# Patient Record
Sex: MALE | Race: WHITE | URBAN - METROPOLITAN AREA
[De-identification: names, ages, dates, MRNs, and addresses within clinical notes are randomized per-mention and may not be internally consistent; named-entity substitution may affect disease eponyms.]

---

## 2018-07-24 ENCOUNTER — INPATIENT (INPATIENT)
Facility: HOSPITAL | Age: 53
LOS: 1 days | Discharge: HOME | End: 2018-07-26
Attending: HOSPITALIST | Admitting: HOSPITALIST

## 2018-07-24 VITALS
DIASTOLIC BLOOD PRESSURE: 94 MMHG | SYSTOLIC BLOOD PRESSURE: 137 MMHG | TEMPERATURE: 99 F | OXYGEN SATURATION: 100 % | HEART RATE: 70 BPM | RESPIRATION RATE: 20 BRPM
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
## 2018-07-24 RX ORDER — OXYCODONE AND ACETAMINOPHEN 5; 325 MG/1; MG/1
1 TABLET ORAL EVERY 4 HOURS
Qty: 0 | Refills: 0 | Status: DISCONTINUED | OUTPATIENT
Start: 2018-07-24 | End: 2018-07-25

## 2018-07-24 RX ORDER — METHOCARBAMOL 500 MG/1
1000 TABLET, FILM COATED ORAL ONCE
Qty: 0 | Refills: 0 | Status: COMPLETED | OUTPATIENT
Start: 2018-07-24 | End: 2018-07-24

## 2018-07-24 RX ORDER — KETOROLAC TROMETHAMINE 30 MG/ML
30 SYRINGE (ML) INJECTION ONCE
Qty: 0 | Refills: 0 | Status: DISCONTINUED | OUTPATIENT
Start: 2018-07-24 | End: 2018-07-24

## 2018-07-24 RX ADMIN — Medication 60 MILLIGRAM(S): at 22:32

## 2018-07-24 RX ADMIN — Medication 30 MILLIGRAM(S): at 23:15

## 2018-07-24 RX ADMIN — METHOCARBAMOL 1000 MILLIGRAM(S): 500 TABLET, FILM COATED ORAL at 22:32

## 2018-07-24 RX ADMIN — Medication 30 MILLIGRAM(S): at 22:32

## 2018-07-24 NOTE — ED ADULT TRIAGE NOTE - CHIEF COMPLAINT QUOTE
Patient BIBA for back pain. Patient was picking up jugs of water when he felt sudden back pain and was unable to move. Morphine 10mg IV 1820 given by EMS. Right AC #18 placed by EMS.

## 2018-07-24 NOTE — ED ADULT NURSE NOTE - OBJECTIVE STATEMENT
Pt  came with EMS A.O times 4 with C.O lower back pain more to the right side after he lifted heavy box 55 pound , pt S.P pain medication is given by EMS ,pt report  slight relief of pain worse on activity .

## 2018-07-25 LAB
ALBUMIN SERPL ELPH-MCNC: 4.6 G/DL — SIGNIFICANT CHANGE UP (ref 3.5–5.2)
ALP SERPL-CCNC: 50 U/L — SIGNIFICANT CHANGE UP (ref 30–115)
ALT FLD-CCNC: 31 U/L — SIGNIFICANT CHANGE UP (ref 0–41)
ANION GAP SERPL CALC-SCNC: 16 MMOL/L — HIGH (ref 7–14)
AST SERPL-CCNC: 26 U/L — SIGNIFICANT CHANGE UP (ref 0–41)
BASOPHILS # BLD AUTO: 0.02 K/UL — SIGNIFICANT CHANGE UP (ref 0–0.2)
BASOPHILS NFR BLD AUTO: 0.2 % — SIGNIFICANT CHANGE UP (ref 0–1)
BILIRUB SERPL-MCNC: 0.4 MG/DL — SIGNIFICANT CHANGE UP (ref 0.2–1.2)
BUN SERPL-MCNC: 13 MG/DL — SIGNIFICANT CHANGE UP (ref 10–20)
CALCIUM SERPL-MCNC: 9.4 MG/DL — SIGNIFICANT CHANGE UP (ref 8.5–10.1)
CHLORIDE SERPL-SCNC: 101 MMOL/L — SIGNIFICANT CHANGE UP (ref 98–110)
CO2 SERPL-SCNC: 21 MMOL/L — SIGNIFICANT CHANGE UP (ref 17–32)
CREAT SERPL-MCNC: 1.1 MG/DL — SIGNIFICANT CHANGE UP (ref 0.7–1.5)
EOSINOPHIL # BLD AUTO: 0.02 K/UL — SIGNIFICANT CHANGE UP (ref 0–0.7)
EOSINOPHIL NFR BLD AUTO: 0.2 % — SIGNIFICANT CHANGE UP (ref 0–8)
GLUCOSE SERPL-MCNC: 125 MG/DL — HIGH (ref 70–99)
HCT VFR BLD CALC: 43.6 % — SIGNIFICANT CHANGE UP (ref 42–52)
HGB BLD-MCNC: 15 G/DL — SIGNIFICANT CHANGE UP (ref 14–18)
IMM GRANULOCYTES NFR BLD AUTO: 0.5 % — HIGH (ref 0.1–0.3)
LYMPHOCYTES # BLD AUTO: 1.14 K/UL — LOW (ref 1.2–3.4)
LYMPHOCYTES # BLD AUTO: 11.3 % — LOW (ref 20.5–51.1)
MCHC RBC-ENTMCNC: 29.6 PG — SIGNIFICANT CHANGE UP (ref 27–31)
MCHC RBC-ENTMCNC: 34.4 G/DL — SIGNIFICANT CHANGE UP (ref 32–37)
MCV RBC AUTO: 86 FL — SIGNIFICANT CHANGE UP (ref 80–94)
MONOCYTES # BLD AUTO: 0.3 K/UL — SIGNIFICANT CHANGE UP (ref 0.1–0.6)
MONOCYTES NFR BLD AUTO: 3 % — SIGNIFICANT CHANGE UP (ref 1.7–9.3)
NEUTROPHILS # BLD AUTO: 8.56 K/UL — HIGH (ref 1.4–6.5)
NEUTROPHILS NFR BLD AUTO: 84.8 % — HIGH (ref 42.2–75.2)
NRBC # BLD: 0 /100 WBCS — SIGNIFICANT CHANGE UP (ref 0–0)
PLATELET # BLD AUTO: 198 K/UL — SIGNIFICANT CHANGE UP (ref 130–400)
POTASSIUM SERPL-MCNC: 4.7 MMOL/L — SIGNIFICANT CHANGE UP (ref 3.5–5)
POTASSIUM SERPL-SCNC: 4.7 MMOL/L — SIGNIFICANT CHANGE UP (ref 3.5–5)
PROT SERPL-MCNC: 7.3 G/DL — SIGNIFICANT CHANGE UP (ref 6–8)
RBC # BLD: 5.07 M/UL — SIGNIFICANT CHANGE UP (ref 4.7–6.1)
RBC # FLD: 12.5 % — SIGNIFICANT CHANGE UP (ref 11.5–14.5)
SODIUM SERPL-SCNC: 138 MMOL/L — SIGNIFICANT CHANGE UP (ref 135–146)
WBC # BLD: 10.09 K/UL — SIGNIFICANT CHANGE UP (ref 4.8–10.8)
WBC # FLD AUTO: 10.09 K/UL — SIGNIFICANT CHANGE UP (ref 4.8–10.8)

## 2018-07-25 RX ORDER — KETOROLAC TROMETHAMINE 30 MG/ML
15 SYRINGE (ML) INJECTION EVERY 6 HOURS
Qty: 0 | Refills: 0 | Status: DISCONTINUED | OUTPATIENT
Start: 2018-07-25 | End: 2018-07-25

## 2018-07-25 RX ORDER — METHOCARBAMOL 500 MG/1
750 TABLET, FILM COATED ORAL ONCE
Qty: 0 | Refills: 0 | Status: COMPLETED | OUTPATIENT
Start: 2018-07-25 | End: 2018-07-25

## 2018-07-25 RX ORDER — IBUPROFEN 200 MG
600 TABLET ORAL EVERY 6 HOURS
Qty: 0 | Refills: 0 | Status: DISCONTINUED | OUTPATIENT
Start: 2018-07-25 | End: 2018-07-25

## 2018-07-25 RX ORDER — ENOXAPARIN SODIUM 100 MG/ML
40 INJECTION SUBCUTANEOUS EVERY 24 HOURS
Qty: 0 | Refills: 0 | Status: DISCONTINUED | OUTPATIENT
Start: 2018-07-25 | End: 2018-07-26

## 2018-07-25 RX ORDER — IBUPROFEN 200 MG
600 TABLET ORAL EVERY 12 HOURS
Qty: 0 | Refills: 0 | Status: DISCONTINUED | OUTPATIENT
Start: 2018-07-25 | End: 2018-07-26

## 2018-07-25 RX ORDER — OXYCODONE AND ACETAMINOPHEN 5; 325 MG/1; MG/1
1 TABLET ORAL ONCE
Qty: 0 | Refills: 0 | Status: DISCONTINUED | OUTPATIENT
Start: 2018-07-25 | End: 2018-07-25

## 2018-07-25 RX ORDER — OXYCODONE AND ACETAMINOPHEN 5; 325 MG/1; MG/1
1 TABLET ORAL EVERY 6 HOURS
Qty: 0 | Refills: 0 | Status: DISCONTINUED | OUTPATIENT
Start: 2018-07-25 | End: 2018-07-26

## 2018-07-25 RX ORDER — METHOCARBAMOL 500 MG/1
750 TABLET, FILM COATED ORAL THREE TIMES A DAY
Qty: 0 | Refills: 0 | Status: DISCONTINUED | OUTPATIENT
Start: 2018-07-25 | End: 2018-07-25

## 2018-07-25 RX ORDER — DIAZEPAM 5 MG
5 TABLET ORAL ONCE
Qty: 0 | Refills: 0 | Status: DISCONTINUED | OUTPATIENT
Start: 2018-07-25 | End: 2018-07-25

## 2018-07-25 RX ORDER — SENNA PLUS 8.6 MG/1
1 TABLET ORAL AT BEDTIME
Qty: 0 | Refills: 0 | Status: DISCONTINUED | OUTPATIENT
Start: 2018-07-25 | End: 2018-07-26

## 2018-07-25 RX ORDER — METHOCARBAMOL 500 MG/1
1000 TABLET, FILM COATED ORAL EVERY 6 HOURS
Qty: 0 | Refills: 0 | Status: DISCONTINUED | OUTPATIENT
Start: 2018-07-25 | End: 2018-07-26

## 2018-07-25 RX ADMIN — OXYCODONE AND ACETAMINOPHEN 1 TABLET(S): 5; 325 TABLET ORAL at 04:35

## 2018-07-25 RX ADMIN — METHOCARBAMOL 1000 MILLIGRAM(S): 500 TABLET, FILM COATED ORAL at 23:13

## 2018-07-25 RX ADMIN — ENOXAPARIN SODIUM 40 MILLIGRAM(S): 100 INJECTION SUBCUTANEOUS at 05:57

## 2018-07-25 RX ADMIN — OXYCODONE AND ACETAMINOPHEN 1 TABLET(S): 5; 325 TABLET ORAL at 02:54

## 2018-07-25 RX ADMIN — OXYCODONE AND ACETAMINOPHEN 1 TABLET(S): 5; 325 TABLET ORAL at 00:18

## 2018-07-25 RX ADMIN — METHOCARBAMOL 750 MILLIGRAM(S): 500 TABLET, FILM COATED ORAL at 12:14

## 2018-07-25 RX ADMIN — Medication 15 MILLIGRAM(S): at 09:50

## 2018-07-25 RX ADMIN — OXYCODONE AND ACETAMINOPHEN 1 TABLET(S): 5; 325 TABLET ORAL at 01:52

## 2018-07-25 RX ADMIN — METHOCARBAMOL 1000 MILLIGRAM(S): 500 TABLET, FILM COATED ORAL at 18:15

## 2018-07-25 RX ADMIN — Medication 15 MILLIGRAM(S): at 10:00

## 2018-07-25 RX ADMIN — Medication 600 MILLIGRAM(S): at 18:15

## 2018-07-25 RX ADMIN — METHOCARBAMOL 750 MILLIGRAM(S): 500 TABLET, FILM COATED ORAL at 05:57

## 2018-07-25 RX ADMIN — Medication 5 MILLIGRAM(S): at 02:09

## 2018-07-25 NOTE — ED ADULT NURSE REASSESSMENT NOTE - NS ED NURSE REASSESS COMMENT FT1
Pt reassessed A.O times 4 Vs stable report filling the same unable to move B/L back still with pain comfort provide safety precaution   maintained , Ed attending made aware on going nursinn5 observation ,

## 2018-07-25 NOTE — PROGRESS NOTE ADULT - SUBJECTIVE AND OBJECTIVE BOX
Brief Summary:  52 year old male with no significant past medical history presents with acute onset lower back pain after exertion. Admitted for intractable pain and inability to ambulate.     Pt seen and examined at bedside. Patient continues to have right sided lower back pain upon movement. Denies any urinary/bowel incontinence, saddle anesthesia, LE paresthesias/hypoesthesias, and LE weakness. No hx of IVDA and lacks systemic signs.     VITAL SIGNS (Last 24 hrs):  T(C): 36.1 (07-25-18 @ 05:22), Max: 37.2 (07-24-18 @ 19:33)  HR: 66 (07-25-18 @ 05:22) (66 - 70)  BP: 119/74 (07-25-18 @ 05:22) (119/74 - 137/94)  RR: 20 (07-25-18 @ 05:22) (18 - 20)  SpO2: 97% (07-24-18 @ 23:40) (97% - 100%)  Wt(kg): --  Daily Height in cm: 170.18 (25 Jul 2018 05:22)    Daily     I&O's Summary      PHYSICAL EXAM:  GENERAL: NAD, well-developed  HEAD:  Atraumatic, Normocephalic  EYES: EOMI, PERRLA, conjunctiva and sclera clear  NECK: Supple, No JVD  CHEST/LUNG: Clear to auscultation bilaterally; No wheeze  HEART: Regular rate and rhythm; No murmurs, rubs, or gallops  ABDOMEN: Soft, Nontender, Nondistended; Bowel sounds present  EXTREMITIES:  2+ Peripheral Pulses, No clubbing, cyanosis, or edema  PSYCH: AAOx3  NEUROLOGY: LE sensation and motor intact.   MSK: SLR negative bilaterally. No paraspinal or spinal tenderness to palpation.   SKIN: No rashes or lesions    Labs Reviewed  Spoke to patient in regards to abnormal labs.    CBC Full  -  ( 25 Jul 2018 02:00 )  WBC Count : 10.09 K/uL  Hemoglobin : 15.0 g/dL  Hematocrit : 43.6 %  Platelet Count - Automated : 198 K/uL  Mean Cell Volume : 86.0 fL  Mean Cell Hemoglobin : 29.6 pg  Mean Cell Hemoglobin Concentration : 34.4 g/dL  Auto Neutrophil # : 8.56 K/uL  Auto Lymphocyte # : 1.14 K/uL  Auto Monocyte # : 0.30 K/uL  Auto Eosinophil # : 0.02 K/uL  Auto Basophil # : 0.02 K/uL  Auto Neutrophil % : 84.8 %  Auto Lymphocyte % : 11.3 %  Auto Monocyte % : 3.0 %  Auto Eosinophil % : 0.2 %  Auto Basophil % : 0.2 %    BMP:    07-25 @ 02:00    Blood Urea Nitrogen - 13  Calcium - 9.4  Carbond Dioxide - 21  Chloride - 101  Creatinine - 1.1  Glucose - 125  Potassium - 4.7  Sodium - 138      Hemoglobin A1c -     Urine Culture:        Imaging reviewed    EKG reviewed    Tele Reviewed    MEDICATIONS  (STANDING):  enoxaparin Injectable 40 milliGRAM(s) SubCutaneous every 24 hours  methocarbamol 1000 milliGRAM(s) Oral every 6 hours    MEDICATIONS  (PRN):  ketorolac   Injectable 15 milliGRAM(s) IV Push every 6 hours PRN Moderate Pain (4 - 6)

## 2018-07-25 NOTE — ED PROVIDER NOTE - NS ED ROS FT
Constitutional: No fever, chills, unintended weight loss.  Eyes:  No visual changes, eye pain or discharge.  ENMT:  No hearing changes, pain, no sore throat or runny nose, no difficulty swallowing  Cardiac:  No chest pain, SOB or edema. No chest pain with exertion.  Respiratory:  No cough or respiratory distress. No hemoptysis. No history of asthma or RAD.  GI:  No nausea, vomiting, diarrhea or abdominal pain.  :  No dysuria, frequency or burning.  MS: see hpi  Neuro:  No headache or weakness.  No LOC.  Skin:  No skin rash.   Endocrine: No history of thyroid disease or diabetes.

## 2018-07-25 NOTE — PROGRESS NOTE ADULT - ATTENDING COMMENTS
This is for H&P as well. case d/w Dr Carbajal. agree w/ above. If pt cannot walk, need rehab eval for d/c planning. Small disc herniations (multiple) from L3-4, L4-5 and L5-S1 - agree w/ conservative mngmt.

## 2018-07-25 NOTE — PROGRESS NOTE ADULT - ASSESSMENT
52 year old male with no significant past medical history presents with acute onset lower back pain after exertion. Admitted for intractable pain and inability to ambulate.     ACUTE LOWER BACK PAIN 2/2 Lumbar Strain   -- MRI negative for radiculopathy   -- Robaxin 1000mg q6h   -- Start Ibuprofen 600mg q12h   -- Pain control with percocet, avoid IV injections   -- Bowel regimen while on opoids   -- Dc planning     DVT PPX: Heparin

## 2018-07-25 NOTE — H&P ADULT - HISTORY OF PRESENT ILLNESS
52y M w no pmh, presents to ed for acute lower back pain.    Pt was bending down while twisting his back while setting down heavy water bottles earlier today when he suddenly felt sharp 10/10 pain across his lower back (R > L).  He was unable to move and was on his hands and knees for while.   He was eventually able to get up but unable to put weight on his R leg due to his back pain.  He was able to sit on the couch for a while, and his wife gave him hot compress on his back with mild alleviation of his pain. However his dog jumped on him which made his back pain intolerable.  Since then he is only able to lay flat and cannot move / sit up without intense pains.     No paresthesias, incontinence.  no radiation of pain down legs.  no le weakness.

## 2018-07-25 NOTE — ED PROVIDER NOTE - CARE PLAN
Principal Discharge DX:	Low back pain  Secondary Diagnosis:	Intractable pain  Secondary Diagnosis:	Inability to walk

## 2018-07-25 NOTE — ED PROVIDER NOTE - OBJECTIVE STATEMENT
52y m no pmh p/w LBP x 1d. Started at 2:30pm immed after lifting a heavy case of water bottles. Rpts sharp/shooting pain across lower back, R>L. Took motrin 800mg po around that time w/min relief. Rec'd morphine 10mg IV from ems around 6:30pm while on route to hospital. Denies cp/sob, nv, abd pain, saddle anesthesia, b/b incontinence, FNDs.

## 2018-07-25 NOTE — H&P ADULT - FAMILY HISTORY
Father  Still living? Unknown  FH: CAD (coronary artery disease), Age at diagnosis: Age Unknown  FH: esophageal cancer, Age at diagnosis: Age Unknown  FH: prostate cancer, Age at diagnosis: Age Unknown

## 2018-07-25 NOTE — ED PROVIDER NOTE - PROGRESS NOTE DETAILS
pt still c/o back pain despite multiple pain meds, just rec'd percocet now - will also give valium and continue to monitor pt still states he is unable to sit up from stretcher - will admit for intractable pain

## 2018-07-25 NOTE — ED PROVIDER NOTE - PHYSICAL EXAMINATION
VITAL SIGNS: I have reviewed nursing notes and confirm.  CONSTITUTIONAL: Well-developed; well-nourished; in no acute distress.  SKIN: Skin exam is warm and dry, no acute rash.  HEAD: Normocephalic; atraumatic.  EYES: PERRL, EOM intact; conjunctiva and sclera clear.  ENT: No nasal discharge; airway clear.  NECK: Supple; non tender.  CARD: S1, S2 normal; no murmurs, gallops, or rubs. Regular rate and rhythm.  RESP: No wheezes, rales or rhonchi.  ABD: Normal bowel sounds; soft; non-distended; non-tender; no hepatosplenomegaly.  BACK: +bl paralumbar ttp, R>L w/muscle spasm, no midline ttp  EXT: Normal ROM. No clubbing, cyanosis or edema. dpi.  NEURO: Alert, oriented. 5/5 strength x 4, no gross sensory deficits, dtrs intact, unable to assess gait as pt in too much pain   PSYCH: Cooperative, appropriate.

## 2018-07-25 NOTE — H&P ADULT - ASSESSMENT
52y M w no pmh, presents to ed for acute lower back pain after bending down while twisting his back when setting down heavy water bottles.    ACUTE LOWER BACK PAIN   --  ddx:  mm strain vs fx vs disc herniation  --  pain control:  toradol for mod pain, morphine if uncontrolled with toradol (with senna+colace);  robaxin q8h  --  imging:  MRI L+S SPINE  --  ns eval if fx or herniation on MRI.       DVT PPX

## 2018-07-25 NOTE — PROGRESS NOTE ADULT - SUBJECTIVE AND OBJECTIVE BOX
FABRIZIO FLORES 52y Male  MRN#: [id number]       SUBJECTIVE    Patient is a 52y old Male who presents with a chief complaint of ACUTE LOWER BACK PAIN (25 Jul 2018 03:25).Patient says he was lifting up a water bottle crate 1 day ago when all of a sudden he started feeling sharp shooting pain across his lower back, more prominent on his right side. The patient says the pain gets worse with movement , with standing. Its better when he is lying still.           OBJECTIVE  PAST MEDICAL & SURGICAL HISTORY  No pertinent past medical history  No significant past surgical history    ALLERGIES:  No Known Allergies    MEDICATIONS:  STANDING MEDICATIONS  enoxaparin Injectable 40 milliGRAM(s) SubCutaneous every 24 hours  methocarbamol 750 milliGRAM(s) Oral once  methocarbamol 750 milliGRAM(s) Oral three times a day    PRN MEDICATIONS  ketorolac   Injectable 15 milliGRAM(s) IV Push every 6 hours PRN      VITAL SIGNS: Last 24 Hours  T(C): 36.1 (25 Jul 2018 05:22), Max: 37.2 (24 Jul 2018 19:33)  T(F): 96.9 (25 Jul 2018 05:22), Max: 98.9 (24 Jul 2018 19:33)  HR: 66 (25 Jul 2018 05:22) (66 - 70)  BP: 119/74 (25 Jul 2018 05:22) (119/74 - 137/94)  BP(mean): --  RR: 20 (25 Jul 2018 05:22) (18 - 20)  SpO2: 97% (24 Jul 2018 23:40) (97% - 100%)    LABS:                        15.0   10.09 )-----------( 198      ( 25 Jul 2018 02:00 )             43.6     07-25    138  |  101  |  13  ----------------------------<  125<H>  4.7   |  21  |  1.1    Ca    9.4      25 Jul 2018 02:00    TPro  7.3  /  Alb  4.6  /  TBili  0.4  /  DBili  x   /  AST  26  /  ALT  31  /  AlkPhos  50  07-25                  RADIOLOGY:      PHYSICAL EXAM:    GENERAL: NAD, well-developed, AAOx3  HEENT:  Atraumatic, Normocephalic. EOMI, PERRLA, conjunctiva and sclera clear, No JVD  PULMONARY: Clear to auscultation bilaterally; No wheeze  CARDIOVASCULAR: Regular rate and rhythm; No murmurs, rubs, or gallops  GASTROINTESTINAL: Soft, Nontender, Nondistended; Bowel sounds present  MUSCULOSKELETAL:  2+ Peripheral Pulses, No clubbing, cyanosis, or edema  NEUROLOGY: non-focal  SKIN: No rashes or lesions      ADMISSION SUMMARY  Patient is a 52y old Male who presents with a chief complaint of ACUTE LOWER BACK PAIN (25 Jul 2018 03:25)  Currently admitted to medicine with the primary diagnosis of Low back pain  Hospital course has been complicated by _______.       ASSESSMENT & PLAN    1. LOW BACK PAIN      2.    3. No pertinent past medical history FABRIZIO FLORES 52y Male  MRN#: [id number]       SUBJECTIVE    Patient is a 52y old Male who presents with a chief complaint of ACUTE LOWER BACK PAIN (25 Jul 2018 03:25).Patient says he was lifting up a water bottle crate 1 day ago when all of a sudden he started feeling sharp shooting pain across his lower back, more prominent on his right side. The patient says the pain gets worse with movement , with standing. Its better when he is lying still.   The patient denies any fever, nausea vomiting, LOC, any complains in bowel movement , any complains in holding his bladder. No urinary complains. Patient denies weakness or sensory loss.    OBJECTIVE  PAST MEDICAL & SURGICAL HISTORY  No pertinent past medical history  No significant past surgical history    ALLERGIES:  No Known Allergies    MEDICATIONS:  STANDING MEDICATIONS  enoxaparin Injectable 40 milliGRAM(s) SubCutaneous every 24 hours  methocarbamol 750 milliGRAM(s) Oral once  methocarbamol 750 milliGRAM(s) Oral three times a day    PRN MEDICATIONS  ketorolac   Injectable 15 milliGRAM(s) IV Push every 6 hours PRN      VITAL SIGNS: Last 24 Hours  T(C): 36.1 (25 Jul 2018 05:22), Max: 37.2 (24 Jul 2018 19:33)  T(F): 96.9 (25 Jul 2018 05:22), Max: 98.9 (24 Jul 2018 19:33)  HR: 66 (25 Jul 2018 05:22) (66 - 70)  BP: 119/74 (25 Jul 2018 05:22) (119/74 - 137/94)  BP(mean): --  RR: 20 (25 Jul 2018 05:22) (18 - 20)  SpO2: 97% (24 Jul 2018 23:40) (97% - 100%)    LABS:                        15.0   10.09 )-----------( 198      ( 25 Jul 2018 02:00 )             43.6     07-25    138  |  101  |  13  ----------------------------<  125<H>  4.7   |  21  |  1.1    Ca    9.4      25 Jul 2018 02:00    TPro  7.3  /  Alb  4.6  /  TBili  0.4  /  DBili  x   /  AST  26  /  ALT  31  /  AlkPhos  50  07-25                  RADIOLOGY:      PHYSICAL EXAM:    GENERAL: NAD, well-developed  HEENT:  Atraumatic, Normocephalic  PULMONARY: Clear to auscultation bilaterally  CARDIOVASCULAR: Regular rate and rhythm  GASTROINTESTINAL: Soft, Nontender, Nondistended  MUSCULOSKELETAL: Straight leg test negative bilaterally. Dorsalis pedis pulse +2 bilaterally.      ADMISSION SUMMARY  Patient is a 52y old Male who presents with a chief complaint of ACUTE LOWER BACK PAIN (25 Jul 2018 03:25)  Currently admitted to medicine with the primary diagnosis of Low back pain      ASSESSMENT & PLAN  1. Lumber Back pain.  MRI of lumbar spine done, awaiting the results.  Ketorolac 15 mg IV q 6hours PRN  Robaxin 1000 mg q 6 hours    2.DVT prophylaxis  Levonox 40 mg q 24 hrs. FABRIZIO FLORES 52y Male  MRN#: [id number]       SUBJECTIVE    Patient is a 52y old Male who presents with a chief complaint of ACUTE LOWER BACK PAIN (25 Jul 2018 03:25).Patient says he was lifting up a water bottle crate 1 day ago when all of a sudden he started feeling sharp shooting pain across his lower back, more prominent on his right side. The patient says the pain gets worse with movement , with standing. Its better when he is lying still.   The patient denies any fever, nausea vomiting, LOC, any complains in bowel movement , any complains in holding his bladder. No urinary complains. Patient denies weakness or sensory loss.    OBJECTIVE  PAST MEDICAL & SURGICAL HISTORY  No pertinent past medical history  No significant past surgical history    ALLERGIES:  No Known Allergies    MEDICATIONS:  STANDING MEDICATIONS  enoxaparin Injectable 40 milliGRAM(s) SubCutaneous every 24 hours  methocarbamol 750 milliGRAM(s) Oral once  methocarbamol 750 milliGRAM(s) Oral three times a day    PRN MEDICATIONS  ketorolac   Injectable 15 milliGRAM(s) IV Push every 6 hours PRN      VITAL SIGNS: Last 24 Hours  T(C): 36.1 (25 Jul 2018 05:22), Max: 37.2 (24 Jul 2018 19:33)  T(F): 96.9 (25 Jul 2018 05:22), Max: 98.9 (24 Jul 2018 19:33)  HR: 66 (25 Jul 2018 05:22) (66 - 70)  BP: 119/74 (25 Jul 2018 05:22) (119/74 - 137/94)  BP(mean): --  RR: 20 (25 Jul 2018 05:22) (18 - 20)  SpO2: 97% (24 Jul 2018 23:40) (97% - 100%)    LABS:                        15.0   10.09 )-----------( 198      ( 25 Jul 2018 02:00 )             43.6     07-25    138  |  101  |  13  ----------------------------<  125<H>  4.7   |  21  |  1.1    Ca    9.4      25 Jul 2018 02:00    TPro  7.3  /  Alb  4.6  /  TBili  0.4  /  DBili  x   /  AST  26  /  ALT  31  /  AlkPhos  50  07-25                  RADIOLOGY:      PHYSICAL EXAM:    GENERAL: NAD, well-developed  HEENT:  Atraumatic, Normocephalic  PULMONARY: Clear to auscultation bilaterally  CARDIOVASCULAR: Regular rate and rhythm  GASTROINTESTINAL: Soft, Nontender, Nondistended  MUSCULOSKELETAL: Straight leg test negative bilaterally. Dorsalis pedis pulse +2 bilaterally.      ADMISSION SUMMARY  Patient is a 52y old Male who presents with a chief complaint of ACUTE LOWER BACK PAIN (25 Jul 2018 03:25)  Currently admitted to medicine with the primary diagnosis of Low back pain      ASSESSMENT & PLAN  1. Lumber Back pain.  MRI of lumbar spine done,   < from: MR Lumbar Spine No Cont (07.25.18 @ 15:03) >  1.  No MRI evidence of acute lumbar spine fracture or subluxation. No   evidence of cord compression.     2.  L3-4 and L4-5: Small central disc herniations with associated annular   fissures. Underlying disc bulging and facet hypertrophy with mild   foraminal stenosis.    3.  L5-S1: Small disc herniation in the left lateral recess with   posterior displacement of the descending left S1 nerve root.    continue on Robaxin 1000 mg q 6 hours  Start Ibuprofen 600mg q12h   Pain control with percocet PRN    PT evaluate and treat. May go from short term rehab.Will Discuss with patient.    2.DVT prophylaxis  Levonox 40 mg q 24 hrs.

## 2018-07-25 NOTE — H&P ADULT - NSHPPHYSICALEXAM_GEN_ALL_CORE
Constitutional: non-toxic, appears in moderate pain when moving  HEENT: eomi,  wnl  Respiratory:  clear lung sounds b/l;   Cardiovascular:  s1, s2,  regular, no murmurs  Gastrointestinal:  nontender, nondistended, +bowel sounds  Extremities: no edema b/l le  Neurological: nonfocal; wnl, aaox3  unable to palpate down spine 2/2 unable to move patient to side or sit up 2/2 intolerable pain.   passive leg raise to ~30' b/l produced no pain  pt unable to raise R leg against gravity 2/2 pain.     ------------------------------------------------------------------     VITAL SIGNS   T(F): 97.6 (07-24 @ 23:40), Max: 98.9 (07-24 @ 19:33)  HR: 68 (07-24 @ 23:40)  BP: 130/78 (07-24 @ 23:40)  RR: 18 (07-24 @ 23:40)  SpO2: 97% (07-24 @ 23:40)

## 2018-07-26 ENCOUNTER — TRANSCRIPTION ENCOUNTER (OUTPATIENT)
Age: 53
End: 2018-07-26

## 2018-07-26 VITALS
SYSTOLIC BLOOD PRESSURE: 115 MMHG | HEART RATE: 61 BPM | DIASTOLIC BLOOD PRESSURE: 73 MMHG | RESPIRATION RATE: 18 BRPM | TEMPERATURE: 98 F

## 2018-07-26 LAB
ALBUMIN SERPL ELPH-MCNC: 4.3 G/DL — SIGNIFICANT CHANGE UP (ref 3.5–5.2)
ALP SERPL-CCNC: 48 U/L — SIGNIFICANT CHANGE UP (ref 30–115)
ALT FLD-CCNC: 32 U/L — SIGNIFICANT CHANGE UP (ref 0–41)
ANION GAP SERPL CALC-SCNC: 14 MMOL/L — SIGNIFICANT CHANGE UP (ref 7–14)
AST SERPL-CCNC: 23 U/L — SIGNIFICANT CHANGE UP (ref 0–41)
BASOPHILS # BLD AUTO: 0.02 K/UL — SIGNIFICANT CHANGE UP (ref 0–0.2)
BASOPHILS NFR BLD AUTO: 0.3 % — SIGNIFICANT CHANGE UP (ref 0–1)
BILIRUB SERPL-MCNC: 0.4 MG/DL — SIGNIFICANT CHANGE UP (ref 0.2–1.2)
BUN SERPL-MCNC: 15 MG/DL — SIGNIFICANT CHANGE UP (ref 10–20)
CALCIUM SERPL-MCNC: 9.1 MG/DL — SIGNIFICANT CHANGE UP (ref 8.5–10.1)
CHLORIDE SERPL-SCNC: 101 MMOL/L — SIGNIFICANT CHANGE UP (ref 98–110)
CHOLEST SERPL-MCNC: 273 MG/DL — HIGH (ref 100–200)
CO2 SERPL-SCNC: 27 MMOL/L — SIGNIFICANT CHANGE UP (ref 17–32)
CREAT SERPL-MCNC: 1.2 MG/DL — SIGNIFICANT CHANGE UP (ref 0.7–1.5)
EOSINOPHIL # BLD AUTO: 0.18 K/UL — SIGNIFICANT CHANGE UP (ref 0–0.7)
EOSINOPHIL NFR BLD AUTO: 2.7 % — SIGNIFICANT CHANGE UP (ref 0–8)
GLUCOSE SERPL-MCNC: 97 MG/DL — SIGNIFICANT CHANGE UP (ref 70–99)
HCT VFR BLD CALC: 45.9 % — SIGNIFICANT CHANGE UP (ref 42–52)
HDLC SERPL-MCNC: 61 MG/DL — SIGNIFICANT CHANGE UP (ref 40–125)
HGB BLD-MCNC: 15.5 G/DL — SIGNIFICANT CHANGE UP (ref 14–18)
IMM GRANULOCYTES NFR BLD AUTO: 0.3 % — SIGNIFICANT CHANGE UP (ref 0.1–0.3)
INR BLD: 1.07 RATIO — SIGNIFICANT CHANGE UP (ref 0.65–1.3)
LACTATE SERPL-SCNC: 1.7 MMOL/L — SIGNIFICANT CHANGE UP (ref 0.5–2.2)
LIPID PNL WITH DIRECT LDL SERPL: 194 MG/DL — HIGH (ref 4–129)
LYMPHOCYTES # BLD AUTO: 1.68 K/UL — SIGNIFICANT CHANGE UP (ref 1.2–3.4)
LYMPHOCYTES # BLD AUTO: 25.6 % — SIGNIFICANT CHANGE UP (ref 20.5–51.1)
MAGNESIUM SERPL-MCNC: 2.2 MG/DL — SIGNIFICANT CHANGE UP (ref 1.8–2.4)
MCHC RBC-ENTMCNC: 29.4 PG — SIGNIFICANT CHANGE UP (ref 27–31)
MCHC RBC-ENTMCNC: 33.8 G/DL — SIGNIFICANT CHANGE UP (ref 32–37)
MCV RBC AUTO: 87.1 FL — SIGNIFICANT CHANGE UP (ref 80–94)
MONOCYTES # BLD AUTO: 0.57 K/UL — SIGNIFICANT CHANGE UP (ref 0.1–0.6)
MONOCYTES NFR BLD AUTO: 8.7 % — SIGNIFICANT CHANGE UP (ref 1.7–9.3)
NEUTROPHILS # BLD AUTO: 4.1 K/UL — SIGNIFICANT CHANGE UP (ref 1.4–6.5)
NEUTROPHILS NFR BLD AUTO: 62.4 % — SIGNIFICANT CHANGE UP (ref 42.2–75.2)
PLATELET # BLD AUTO: 200 K/UL — SIGNIFICANT CHANGE UP (ref 130–400)
POTASSIUM SERPL-MCNC: 4.2 MMOL/L — SIGNIFICANT CHANGE UP (ref 3.5–5)
POTASSIUM SERPL-SCNC: 4.2 MMOL/L — SIGNIFICANT CHANGE UP (ref 3.5–5)
PROT SERPL-MCNC: 7.1 G/DL — SIGNIFICANT CHANGE UP (ref 6–8)
PROTHROM AB SERPL-ACNC: 11.6 SEC — SIGNIFICANT CHANGE UP (ref 9.95–12.87)
RBC # BLD: 5.27 M/UL — SIGNIFICANT CHANGE UP (ref 4.7–6.1)
RBC # FLD: 12.9 % — SIGNIFICANT CHANGE UP (ref 11.5–14.5)
SODIUM SERPL-SCNC: 142 MMOL/L — SIGNIFICANT CHANGE UP (ref 135–146)
TOTAL CHOLESTEROL/HDL RATIO MEASUREMENT: 4.5 RATIO — SIGNIFICANT CHANGE UP (ref 4–5.5)
TRIGL SERPL-MCNC: 218 MG/DL — HIGH (ref 10–149)
WBC # BLD: 6.57 K/UL — SIGNIFICANT CHANGE UP (ref 4.8–10.8)
WBC # FLD AUTO: 6.57 K/UL — SIGNIFICANT CHANGE UP (ref 4.8–10.8)

## 2018-07-26 RX ORDER — ATORVASTATIN CALCIUM 80 MG/1
1 TABLET, FILM COATED ORAL
Qty: 30 | Refills: 0 | OUTPATIENT
Start: 2018-07-26 | End: 2018-08-24

## 2018-07-26 RX ORDER — IBUPROFEN 200 MG
1 TABLET ORAL
Qty: 21 | Refills: 0 | OUTPATIENT
Start: 2018-07-26 | End: 2018-08-01

## 2018-07-26 RX ORDER — METHOCARBAMOL 500 MG/1
3 TABLET, FILM COATED ORAL
Qty: 60 | Refills: 0 | OUTPATIENT
Start: 2018-07-26 | End: 2018-07-28

## 2018-07-26 RX ORDER — SIMETHICONE 80 MG/1
80 TABLET, CHEWABLE ORAL EVERY 4 HOURS
Qty: 0 | Refills: 0 | Status: DISCONTINUED | OUTPATIENT
Start: 2018-07-26 | End: 2018-07-26

## 2018-07-26 RX ADMIN — Medication 600 MILLIGRAM(S): at 06:11

## 2018-07-26 RX ADMIN — METHOCARBAMOL 1000 MILLIGRAM(S): 500 TABLET, FILM COATED ORAL at 06:12

## 2018-07-26 RX ADMIN — METHOCARBAMOL 1000 MILLIGRAM(S): 500 TABLET, FILM COATED ORAL at 11:30

## 2018-07-26 NOTE — PHYSICAL THERAPY INITIAL EVALUATION ADULT - RANGE OF MOTION EXAMINATION, REHAB EVAL
limited B terminal knee ext and trunk 2/2 to LBP/bilateral upper extremity ROM was WNL (within normal limits)

## 2018-07-26 NOTE — DISCHARGE NOTE ADULT - PATIENT PORTAL LINK FT
You can access the [facility] Patient Portal, offered by [facility], by registering with the following website: [email or web address]

## 2018-07-26 NOTE — DISCHARGE NOTE ADULT - PLAN OF CARE
To control the pain and rule out the possibility of Cord injury The lumbar MRI was done to rule out the possibility of cord compression. MRI was negative for any acute injury. Patient advised to manage the pain with short term muscle relaxant and Ibuprofen.

## 2018-07-26 NOTE — DISCHARGE NOTE ADULT - CARE PLAN
Principal Discharge DX:	Low back pain  Goal:	To control the pain and rule out the possibility of Cord injury  Assessment and plan of treatment:	The lumbar MRI was done to rule out the possibility of cord compression. MRI was negative for any acute injury. Patient advised to manage the pain with short term muscle relaxant and Ibuprofen.

## 2018-07-26 NOTE — DISCHARGE NOTE ADULT - MEDICATION SUMMARY - MEDICATIONS TO TAKE
I will START or STAY ON the medications listed below when I get home from the hospital:    ibuprofen 600 mg oral tablet  -- 1 tab(s) by mouth every 8 hours   -- Do not take this drug if you are pregnant.  It is very important that you take or use this exactly as directed.  Do not skip doses or discontinue unless directed by your doctor.  May cause drowsiness or dizziness.  Obtain medical advice before taking any non-prescription drugs as some may affect the action of this medication.  Take with food or milk.    -- Indication: For Lumbar pain    Lipitor 80 mg oral tablet  -- 1 tab(s) by mouth once a day   -- Avoid grapefruit and grapefruit juice while taking this medication.  Do not take this drug if you are pregnant.  It is very important that you take or use this exactly as directed.  Do not skip doses or discontinue unless directed by your doctor.  Obtain medical advice before taking any non-prescription drugs as some may affect the action of this medication.  Take with food or milk.    -- Indication: For  Hyperlipidemia Prophylaxis    methocarbamol 500 mg oral tablet  -- 3 tab(s) by mouth 4 times for 3 days  2 tab(s) by mouth 2 times for 4 days  then once daily as needed for 5 days then stop  -- May cause drowsiness.  Alcohol may intensify this effect.  Use care when operating dangerous machinery.    -- Indication: For Lumbar pain

## 2018-07-26 NOTE — PROGRESS NOTE ADULT - SUBJECTIVE AND OBJECTIVE BOX
Brief Summary:  52 year old male with no significant past medical history presents with acute onset lower back pain after exertion. Admitted for intractable pain and inability to ambulate. MRI revealed no significant pathology. His symptoms are likely due to lumbar strain which has improved with muscle relaxant and anti-inflammatory agents.     Pt seen and examined at bedside. Patient is doing better. He was able to get out of bed and ambulate.     VITAL SIGNS (Last 24 hrs):  T(C): 36.1 (07-25-18 @ 05:22), Max: 37.2 (07-24-18 @ 19:33)  HR: 66 (07-25-18 @ 05:22) (66 - 70)  BP: 119/74 (07-25-18 @ 05:22) (119/74 - 137/94)  RR: 20 (07-25-18 @ 05:22) (18 - 20)  SpO2: 97% (07-24-18 @ 23:40) (97% - 100%)  Wt(kg): --  Daily Height in cm: 170.18 (25 Jul 2018 05:22)    Daily     I&O's Summary      PHYSICAL EXAM:  GENERAL: NAD, well-developed  HEAD:  Atraumatic, Normocephalic  EYES: EOMI, PERRLA, conjunctiva and sclera clear  NECK: Supple, No JVD  CHEST/LUNG: Clear to auscultation bilaterally; No wheeze  HEART: Regular rate and rhythm; No murmurs, rubs, or gallops  ABDOMEN: Soft, Nontender, Nondistended; Bowel sounds present  EXTREMITIES:  2+ Peripheral Pulses, No clubbing, cyanosis, or edema  PSYCH: AAOx3  NEUROLOGY: LE sensation and motor intact.   MSK: SLR negative bilaterally. No paraspinal or spinal tenderness to palpation.   SKIN: No rashes or lesions    Labs Reviewed  Spoke to patient in regards to abnormal labs.    CBC Full  -  ( 25 Jul 2018 02:00 )  WBC Count : 10.09 K/uL  Hemoglobin : 15.0 g/dL  Hematocrit : 43.6 %  Platelet Count - Automated : 198 K/uL  Mean Cell Volume : 86.0 fL  Mean Cell Hemoglobin : 29.6 pg  Mean Cell Hemoglobin Concentration : 34.4 g/dL  Auto Neutrophil # : 8.56 K/uL  Auto Lymphocyte # : 1.14 K/uL  Auto Monocyte # : 0.30 K/uL  Auto Eosinophil # : 0.02 K/uL  Auto Basophil # : 0.02 K/uL  Auto Neutrophil % : 84.8 %  Auto Lymphocyte % : 11.3 %  Auto Monocyte % : 3.0 %  Auto Eosinophil % : 0.2 %  Auto Basophil % : 0.2 %    BMP:    07-25 @ 02:00    Blood Urea Nitrogen - 13  Calcium - 9.4  Carbond Dioxide - 21  Chloride - 101  Creatinine - 1.1  Glucose - 125  Potassium - 4.7  Sodium - 138      Hemoglobin A1c -     Urine Culture:        Imaging reviewed    EKG reviewed    Tele Reviewed    MEDICATIONS  (STANDING):  enoxaparin Injectable 40 milliGRAM(s) SubCutaneous every 24 hours  methocarbamol 1000 milliGRAM(s) Oral every 6 hours    MEDICATIONS  (PRN):  ketorolac   Injectable 15 milliGRAM(s) IV Push every 6 hours PRN Moderate Pain (4 - 6)

## 2018-07-26 NOTE — DISCHARGE NOTE ADULT - HOSPITAL COURSE
Patient is a 52y old Male who presents with a chief complaint of ACUTE LOWER BACK PAIN (25 Jul 2018 03:25). Patient was evaluated for possible cord compression and acute injury. The MRI spine was done and it was negative for disc herniation or cord compression.   The patient denies any fever, nausea vomiting, LOC, any complains in bowel movement , any complains in holding his bladder. No urinary complains. Patient denies weakness or sensory loss. The pain was controlled with muscle relaxant and Ibuprofen. The patient advised to continue the medications for short term along with physical therapy. F/U with the PCP.   Patient understand the plan and agrees to it.

## 2018-07-26 NOTE — PROGRESS NOTE ADULT - ASSESSMENT
52 year old male with no significant past medical history presents with acute onset lower back pain after exertion. Admitted for intractable pain and inability to ambulate.     ACUTE LOWER BACK PAIN 2/2 Lumbar Strain   -- MRI shows small disc herniations which are likely incidental findings and his symptoms are due to lumbar strain   -- Robaxin 1000mg q6h   -- Ibuprofen 600mg q12h   -- Pain control with percocet, avoid IV injections   -- Bowel regimen while on opoids   -- Dc planning     Dyslipidemia   - start lipitor 80mg at bedtime     DVT PPX: Heparin

## 2018-07-26 NOTE — PROGRESS NOTE ADULT - SUBJECTIVE AND OBJECTIVE BOX
FABRIZIO FLORES 52y Male  MRN#: [id number]       SUBJECTIVE  Patient is a 52y old Male who presents with a chief complaint of ACUTE LOWER BACK PAIN (25 Jul 2018 03:25).The patient is feeling better. He received physical therapy session and says it helped him. The patient says           OBJECTIVE  PAST MEDICAL & SURGICAL HISTORY  No pertinent past medical history  No significant past surgical history    ALLERGIES:  No Known Allergies    MEDICATIONS:  STANDING MEDICATIONS  enoxaparin Injectable 40 milliGRAM(s) SubCutaneous every 24 hours  ibuprofen  Tablet 600 milliGRAM(s) Oral every 12 hours  methocarbamol 1000 milliGRAM(s) Oral every 6 hours  senna 1 Tablet(s) Oral at bedtime    PRN MEDICATIONS  oxyCODONE    5 mG/acetaminophen 325 mG 1 Tablet(s) Oral every 6 hours PRN  simethicone 80 milliGRAM(s) Chew every 4 hours PRN      VITAL SIGNS: Last 24 Hours  T(C): 36.6 (26 Jul 2018 05:08), Max: 36.7 (25 Jul 2018 20:16)  T(F): 97.8 (26 Jul 2018 05:08), Max: 98.1 (25 Jul 2018 20:16)  HR: 61 (26 Jul 2018 05:08) (61 - 78)  BP: 115/73 (26 Jul 2018 05:08) (115/73 - 123/68)  BP(mean): --  RR: 18 (26 Jul 2018 05:08) (18 - 19)  SpO2: --    LABS:                        15.5   6.57  )-----------( 200      ( 26 Jul 2018 08:49 )             45.9     07-26    142  |  101  |  15  ----------------------------<  97  4.2   |  27  |  1.2    Ca    9.1      26 Jul 2018 08:49  Mg     2.2     07-26    TPro  7.1  /  Alb  4.3  /  TBili  0.4  /  DBili  x   /  AST  23  /  ALT  32  /  AlkPhos  48  07-26    PT/INR - ( 26 Jul 2018 08:49 )   PT: 11.60 sec;   INR: 1.07 ratio               Lactate, Blood: 1.7 mmol/L (07-26-18 @ 08:49)          RADIOLOGY:      PHYSICAL EXAM:    GENERAL: NAD, well-developed, AAOx3  HEENT:  Atraumatic, Normocephalic. EOMI, PERRLA, conjunctiva and sclera clear, No JVD  PULMONARY: Clear to auscultation bilaterally; No wheeze  CARDIOVASCULAR: Regular rate and rhythm; No murmurs, rubs, or gallops  GASTROINTESTINAL: Soft, Nontender, Nondistended; Bowel sounds present  MUSCULOSKELETAL:  2+ Peripheral Pulses, No clubbing, cyanosis, or edema  NEUROLOGY: non-focal  SKIN: No rashes or lesions      ADMISSION SUMMARY  Patient is a 52y old Male who presents with a chief complaint of ACUTE LOWER BACK PAIN (25 Jul 2018 03:25)  Currently admitted to medicine with the primary diagnosis of Low back pain  Hospital course has been complicated by _______.       ASSESSMENT & PLAN    1. LOW BACK PAIN      2.    3. No pertinent past medical history FABRIZIO FLORES 52y Male  MRN#: [id number]       SUBJECTIVE  Patient is a 52y old Male who presents with a chief complaint of ACUTE LOWER BACK PAIN (25 Jul 2018 03:25).The patient is feeling better. He received physical therapy session and says it helped him. The patient says his pain has not progressed and controlled with the medications.  Denies fever, nausea vomiting.     OBJECTIVE  PAST MEDICAL & SURGICAL HISTORY  No pertinent past medical history  No significant past surgical history    ALLERGIES:  No Known Allergies    MEDICATIONS:  STANDING MEDICATIONS  enoxaparin Injectable 40 milliGRAM(s) SubCutaneous every 24 hours  ibuprofen  Tablet 600 milliGRAM(s) Oral every 12 hours  methocarbamol 1000 milliGRAM(s) Oral every 6 hours  senna 1 Tablet(s) Oral at bedtime    PRN MEDICATIONS  oxyCODONE    5 mG/acetaminophen 325 mG 1 Tablet(s) Oral every 6 hours PRN  simethicone 80 milliGRAM(s) Chew every 4 hours PRN      VITAL SIGNS: Last 24 Hours  T(C): 36.6 (26 Jul 2018 05:08), Max: 36.7 (25 Jul 2018 20:16)  T(F): 97.8 (26 Jul 2018 05:08), Max: 98.1 (25 Jul 2018 20:16)  HR: 61 (26 Jul 2018 05:08) (61 - 78)  BP: 115/73 (26 Jul 2018 05:08) (115/73 - 123/68)  BP(mean): --  RR: 18 (26 Jul 2018 05:08) (18 - 19)  SpO2: --    LABS:                        15.5   6.57  )-----------( 200      ( 26 Jul 2018 08:49 )             45.9     07-26    142  |  101  |  15  ----------------------------<  97  4.2   |  27  |  1.2    Ca    9.1      26 Jul 2018 08:49  Mg     2.2     07-26    TPro  7.1  /  Alb  4.3  /  TBili  0.4  /  DBili  x   /  AST  23  /  ALT  32  /  AlkPhos  48  07-26    PT/INR - ( 26 Jul 2018 08:49 )   PT: 11.60 sec;   INR: 1.07 ratio               Lactate, Blood: 1.7 mmol/L (07-26-18 @ 08:49)          RADIOLOGY:      PHYSICAL EXAM:    GENERAL: NAD, well-developed, AAOx3  HEENT:  Atraumatic, Normocephalic. EOMI, PERRLA, conjunctiva and sclera clear, No JVD  PULMONARY: Clear to auscultation bilaterally; No wheeze  CARDIOVASCULAR: Regular rate and rhythm; No murmurs, rubs, or gallops FABRIZIO FLORES 52y Male  MRN#: [id number]       SUBJECTIVE  Patient is a 52y old Male who presents with a chief complaint of ACUTE LOWER BACK PAIN (25 Jul 2018 03:25).The patient is feeling better. He received physical therapy session and says it helped him. The patient says his pain has not progressed and controlled with the medications.  Denies fever, nausea vomiting.     OBJECTIVE  PAST MEDICAL & SURGICAL HISTORY  No pertinent past medical history  No significant past surgical history    ALLERGIES:  No Known Allergies    MEDICATIONS:  STANDING MEDICATIONS  enoxaparin Injectable 40 milliGRAM(s) SubCutaneous every 24 hours  ibuprofen  Tablet 600 milliGRAM(s) Oral every 12 hours  methocarbamol 1000 milliGRAM(s) Oral every 6 hours  senna 1 Tablet(s) Oral at bedtime    PRN MEDICATIONS  oxyCODONE    5 mG/acetaminophen 325 mG 1 Tablet(s) Oral every 6 hours PRN  simethicone 80 milliGRAM(s) Chew every 4 hours PRN      VITAL SIGNS: Last 24 Hours  T(C): 36.6 (26 Jul 2018 05:08), Max: 36.7 (25 Jul 2018 20:16)  T(F): 97.8 (26 Jul 2018 05:08), Max: 98.1 (25 Jul 2018 20:16)  HR: 61 (26 Jul 2018 05:08) (61 - 78)  BP: 115/73 (26 Jul 2018 05:08) (115/73 - 123/68)  BP(mean): --  RR: 18 (26 Jul 2018 05:08) (18 - 19)  SpO2: --    LABS:                        15.5   6.57  )-----------( 200      ( 26 Jul 2018 08:49 )             45.9     07-26    142  |  101  |  15  ----------------------------<  97  4.2   |  27  |  1.2    Ca    9.1      26 Jul 2018 08:49  Mg     2.2     07-26    TPro  7.1  /  Alb  4.3  /  TBili  0.4  /  DBili  x   /  AST  23  /  ALT  32  /  AlkPhos  48  07-26    PT/INR - ( 26 Jul 2018 08:49 )   PT: 11.60 sec;   INR: 1.07 ratio               Lactate, Blood: 1.7 mmol/L (07-26-18 @ 08:49)          RADIOLOGY:      PHYSICAL EXAM:    GENERAL: NAD, well-developed, AAOx3  HEENT:  Atraumatic, Normocephalic. EOMI, PERRLA, conjunctiva and sclera clear, No JVD  PULMONARY: Clear to auscultation bilaterally; No wheeze  CARDIOVASCULAR: Regular rate and rhythm; No murmurs, rubs, or gallops    ASSESSMENT & PLAN    1. Lumber Back pain.    PT evaluate and treat. May go from short term rehab. Plan discussed with the patient.      2.DVT prophylaxis  Levonox 40 mg q 24 hrs. FABRIZIO FLORES 52y Male  MRN#: [id number]       SUBJECTIVE  Patient is a 52y old Male who presents with a chief complaint of ACUTE LOWER BACK PAIN (25 Jul 2018 03:25).The patient is feeling better. He received physical therapy session and says it helped him. The patient says his pain has not progressed and controlled with the medications.  Denies fever, nausea vomiting.     OBJECTIVE  PAST MEDICAL & SURGICAL HISTORY  No pertinent past medical history  No significant past surgical history    ALLERGIES:  No Known Allergies    MEDICATIONS:  STANDING MEDICATIONS  enoxaparin Injectable 40 milliGRAM(s) SubCutaneous every 24 hours  ibuprofen  Tablet 600 milliGRAM(s) Oral every 12 hours  methocarbamol 1000 milliGRAM(s) Oral every 6 hours  senna 1 Tablet(s) Oral at bedtime    PRN MEDICATIONS  oxyCODONE    5 mG/acetaminophen 325 mG 1 Tablet(s) Oral every 6 hours PRN  simethicone 80 milliGRAM(s) Chew every 4 hours PRN      VITAL SIGNS: Last 24 Hours  T(C): 36.6 (26 Jul 2018 05:08), Max: 36.7 (25 Jul 2018 20:16)  T(F): 97.8 (26 Jul 2018 05:08), Max: 98.1 (25 Jul 2018 20:16)  HR: 61 (26 Jul 2018 05:08) (61 - 78)  BP: 115/73 (26 Jul 2018 05:08) (115/73 - 123/68)  BP(mean): --  RR: 18 (26 Jul 2018 05:08) (18 - 19)  SpO2: --    LABS:                        15.5   6.57  )-----------( 200      ( 26 Jul 2018 08:49 )             45.9     07-26    142  |  101  |  15  ----------------------------<  97  4.2   |  27  |  1.2    Ca    9.1      26 Jul 2018 08:49  Mg     2.2     07-26    TPro  7.1  /  Alb  4.3  /  TBili  0.4  /  DBili  x   /  AST  23  /  ALT  32  /  AlkPhos  48  07-26    PT/INR - ( 26 Jul 2018 08:49 )   PT: 11.60 sec;   INR: 1.07 ratio               Lactate, Blood: 1.7 mmol/L (07-26-18 @ 08:49)          RADIOLOGY:      PHYSICAL EXAM:    GENERAL: NAD, well-developed, AAOx3  HEENT:  Atraumatic, Normocephalic. EOMI, PERRLA, conjunctiva and sclera clear, No JVD  PULMONARY: Clear to auscultation bilaterally; No wheeze  CARDIOVASCULAR: Regular rate and rhythm; No murmurs, rubs, or gallops    ASSESSMENT & PLAN    1. Lumber Back pain.    PT evaluate and treat. May go from short term rehab. Plan discussed with the patient.  Take Robaxin 1500 mg q 6 hrs for 3 days  then 1000 mg q 12 hours for 4 days  then 1 once daily    take ibuprofen PRN for pain for 7 days.        2.DVT prophylaxis  Levonox 40 mg q 24 hrs.    Patient understands the plan. Patient agrees to it.

## 2018-07-31 DIAGNOSIS — M54.5 LOW BACK PAIN: ICD-10-CM
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
## 2018-07-31 DIAGNOSIS — S39.012A STRAIN OF MUSCLE, FASCIA AND TENDON OF LOWER BACK, INITIAL ENCOUNTER: ICD-10-CM
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
## 2018-07-31 DIAGNOSIS — R26.2 DIFFICULTY IN WALKING, NOT ELSEWHERE CLASSIFIED: ICD-10-CM

## 2023-12-22 PROBLEM — Z00.00 ENCOUNTER FOR PREVENTIVE HEALTH EXAMINATION: Status: ACTIVE | Noted: 2023-12-22

## 2024-01-02 ENCOUNTER — OUTPATIENT (OUTPATIENT)
Dept: OUTPATIENT SERVICES | Facility: HOSPITAL | Age: 59
LOS: 1 days | End: 2024-01-02
Payer: COMMERCIAL

## 2024-01-02 DIAGNOSIS — Z00.8 ENCOUNTER FOR OTHER GENERAL EXAMINATION: ICD-10-CM

## 2024-01-02 DIAGNOSIS — R07.9 CHEST PAIN, UNSPECIFIED: ICD-10-CM

## 2024-01-02 PROCEDURE — 75574 CT ANGIO HRT W/3D IMAGE: CPT

## 2024-01-02 PROCEDURE — 75574 CT ANGIO HRT W/3D IMAGE: CPT | Mod: 26

## 2024-01-03 DIAGNOSIS — R07.9 CHEST PAIN, UNSPECIFIED: ICD-10-CM

## 2024-01-18 ENCOUNTER — OUTPATIENT (OUTPATIENT)
Dept: OUTPATIENT SERVICES | Facility: HOSPITAL | Age: 59
LOS: 1 days | End: 2024-01-18
Payer: COMMERCIAL

## 2024-01-18 PROCEDURE — 75580 N-INVAS EST C FFR SW ALY CTA: CPT

## 2024-01-18 PROCEDURE — 75580 N-INVAS EST C FFR SW ALY CTA: CPT | Mod: 26

## 2024-01-19 DIAGNOSIS — R07.9 CHEST PAIN, UNSPECIFIED: ICD-10-CM

## 2024-01-19 PROCEDURE — 75580 N-INVAS EST C FFR SW ALY CTA: CPT | Mod: 26

## 2024-01-20 DIAGNOSIS — R07.9 CHEST PAIN, UNSPECIFIED: ICD-10-CM

## 2024-01-23 PROCEDURE — 75580 N-INVAS EST C FFR SW ALY CTA: CPT | Mod: 26

## 2025-06-03 NOTE — PHYSICAL THERAPY INITIAL EVALUATION ADULT - LIVES WITH, PROFILE
Called patient regarding rescheduling surgery with Dr. Caro to 11/14.    Voicemail is full.     P: [phone]    __    Hailey Alvarado, Perioperative Coordinator, on 11/8/2022       spouse No